# Patient Record
Sex: MALE | Race: OTHER | Employment: FULL TIME | URBAN - METROPOLITAN AREA
[De-identification: names, ages, dates, MRNs, and addresses within clinical notes are randomized per-mention and may not be internally consistent; named-entity substitution may affect disease eponyms.]

---

## 2018-12-12 ENCOUNTER — OFFICE VISIT (OUTPATIENT)
Dept: FAMILY MEDICINE CLINIC | Age: 48
End: 2018-12-12

## 2018-12-12 VITALS
DIASTOLIC BLOOD PRESSURE: 72 MMHG | TEMPERATURE: 98 F | SYSTOLIC BLOOD PRESSURE: 118 MMHG | RESPIRATION RATE: 18 BRPM | HEIGHT: 67 IN | HEART RATE: 80 BPM | BODY MASS INDEX: 26.68 KG/M2 | WEIGHT: 170 LBS

## 2018-12-12 DIAGNOSIS — B20 HIV (HUMAN IMMUNODEFICIENCY VIRUS INFECTION) (HCC): Primary | ICD-10-CM

## 2018-12-12 RX ORDER — ABACAVIR 300 MG/1
TABLET ORAL 2 TIMES DAILY
COMMUNITY
End: 2019-03-06 | Stop reason: SDUPTHER

## 2018-12-12 RX ORDER — FLUCONAZOLE 100 MG/1
200 TABLET ORAL DAILY
COMMUNITY
End: 2019-11-25 | Stop reason: ALTCHOICE

## 2018-12-12 RX ORDER — SULFAMETHOXAZOLE AND TRIMETHOPRIM 800; 160 MG/1; MG/1
1 TABLET ORAL 2 TIMES DAILY
COMMUNITY
End: 2019-03-13 | Stop reason: ALTCHOICE

## 2018-12-12 RX ORDER — CIPROFLOXACIN 500 MG/1
TABLET ORAL 2 TIMES DAILY
COMMUNITY
End: 2019-11-25 | Stop reason: ALTCHOICE

## 2018-12-12 RX ORDER — LAMIVUDINE 150 MG/1
TABLET, FILM COATED ORAL
COMMUNITY
End: 2019-03-06 | Stop reason: SDUPTHER

## 2018-12-12 RX ORDER — PANTOPRAZOLE SODIUM 40 MG/1
40 TABLET, DELAYED RELEASE ORAL DAILY
COMMUNITY
End: 2022-04-20

## 2018-12-12 RX ORDER — ACYCLOVIR 400 MG/1
400 TABLET ORAL
COMMUNITY
End: 2019-11-25 | Stop reason: ALTCHOICE

## 2018-12-20 NOTE — PROGRESS NOTES
Infectious Disease Consult    Impression/Plan   · Newly diagnosed HIV. Most recent CD4 80. On triumeq, azithro, and tmp-smx. Will order staging labs. RTO 3 months. Will discuss timing of vaccinations with Oncology  · Stage 4 large B cell lymphoma. R-EPOCH under supervision of Dr Dominga Linares:   1. Triumeq  2. Azithromycin  3. tmp-smx  4. Fluconazole, cipro, acyclovir (per oncology on chemotherapy)    Subjective:   Date of Consultation:  2018  Referring Physician: Dr Rocco Tolliver    Patient is a 46 y.o. male who is being seen for newly diagnosed HIV. Recently diagnosed with lymphoma  W/u revealed patient to be HIV positive. Seen by Dr Rocco Tolliver at [facility] and was started on HAART with triumeq. Labs at that time revealed CD4 of 84 and ,000. Has also started chemotherapy for the lymphoma. Risk for HIV is MSM    Patient Active Problem List   Diagnosis Code    HIV (human immunodeficiency virus infection) ([facility] B20     Past Medical History:   Diagnosis Date    HIV (human immunodeficiency virus infection) ([facility]       History reviewed. No pertinent family history. Social History     Tobacco Use    Smoking status: Never Smoker    Smokeless tobacco: Never Used   Substance Use Topics    Alcohol use: No     Frequency: Never     History reviewed. No pertinent surgical history. Prior to Admission medications    Medication Sig Start Date End Date Taking? Authorizing Provider   abacavir (ZIAGEN) 300 mg tablet Take  by mouth two (2) times a day. Yes Provider, Historical   acyclovir (ZOVIRAX) 400 mg tablet Take 400 mg by mouth every four (4) hours (while awake). Yes Provider, Historical   ciprofloxacin HCl (CIPRO) 500 mg tablet Take  by mouth two (2) times a day. Yes Provider, Historical   dolutegravir (TIVICAY) 50 mg tab tablet Take  by mouth. Yes Provider, Historical   fluconazole (DIFLUCAN) 100 mg tablet Take 200 mg by mouth daily.  FDA advises cautious prescribing of oral fluconazole in pregnancy. Yes Provider, Historical   lamiVUDine (EPIVIR) 150 mg tablet Take  by mouth. Yes Provider, Historical   pantoprazole (PROTONIX) 40 mg tablet Take 40 mg by mouth daily. Yes Provider, Historical   trimethoprim-sulfamethoxazole (BACTRIM DS, SEPTRA DS) 160-800 mg per tablet Take 1 Tab by mouth two (2) times a day. Yes Provider, Historical     No Known Allergies     Review of Systems:  A comprehensive review of systems was negative except for that written in the History of Present Illness. Objective:   Blood pressure 118/72, pulse 80, temperature 98 °F (36.7 °C), temperature source Oral, resp. rate 18, height 5' 7\" (1.702 m), weight 77.1 kg (170 lb). @TBEP(96)@     Exam:    General:  Alert, cooperative, well noursished, well developed, appears stated age   Eyes:  Sclera anicteric. Mouth/Throat: Mucous membranes moist   Neck: Supple   Lungs:   Clear to auscultation bilaterally   CV:  Regular rate and rhythm   Abdomen:   Soft, non-tender. Extremities: No edema   Skin: No rash   Lymph nodes:    Musculoskeletal:    Lines/Devices: I   Psych: Alert and oriented, normal mood        Data Review: No results found for this or any previous visit (from the past 24 hour(s)).      Microbiology:      Studies:  Signed By: Mechelle Smith DO     December 20, 2018

## 2019-03-08 NOTE — TELEPHONE ENCOUNTER
Pharmacy called to check status of refills. States pt will run out of medication this weekend. Please address as soon as possible.

## 2019-03-11 RX ORDER — ABACAVIR 300 MG/1
300 TABLET ORAL 2 TIMES DAILY
Qty: 60 TAB | Refills: 5 | Status: SHIPPED | OUTPATIENT
Start: 2019-03-11 | End: 2019-08-28 | Stop reason: SDUPTHER

## 2019-03-11 RX ORDER — LAMIVUDINE 150 MG/1
150 TABLET, FILM COATED ORAL 2 TIMES DAILY
Qty: 60 TAB | Refills: 5 | Status: SHIPPED | OUTPATIENT
Start: 2019-03-11 | End: 2019-08-28 | Stop reason: SDUPTHER

## 2019-03-13 ENCOUNTER — OFFICE VISIT (OUTPATIENT)
Dept: FAMILY MEDICINE CLINIC | Age: 49
End: 2019-03-13

## 2019-03-13 VITALS
DIASTOLIC BLOOD PRESSURE: 87 MMHG | WEIGHT: 182 LBS | HEIGHT: 67 IN | SYSTOLIC BLOOD PRESSURE: 129 MMHG | HEART RATE: 86 BPM | BODY MASS INDEX: 28.56 KG/M2 | TEMPERATURE: 97.9 F | RESPIRATION RATE: 18 BRPM

## 2019-03-13 DIAGNOSIS — B20 HIV (HUMAN IMMUNODEFICIENCY VIRUS INFECTION) (HCC): Primary | ICD-10-CM

## 2019-03-13 RX ORDER — SULFAMETHOXAZOLE AND TRIMETHOPRIM 800; 160 MG/1; MG/1
1 TABLET ORAL DAILY
Qty: 30 TAB | Refills: 5 | Status: SHIPPED | OUTPATIENT
Start: 2019-03-13 | End: 2019-08-28 | Stop reason: SDUPTHER

## 2019-03-13 RX ORDER — AZITHROMYCIN 600 MG/1
1200 TABLET, FILM COATED ORAL
Qty: 8 TAB | Refills: 3 | Status: SHIPPED | OUTPATIENT
Start: 2019-03-18 | End: 2019-06-13 | Stop reason: ALTCHOICE

## 2019-03-13 NOTE — PROGRESS NOTES
Chief Complaint   Patient presents with    Medication Evaluation     HIV    Labs     needs to have updated labs drawn

## 2019-03-14 NOTE — PROGRESS NOTES
[facility] Infectious Disease Specialists Progress Note           Violetta Morgan DO    [phone] Office  [phone]  Fax    3/14/2019      Assessment & Plan:   1. Recently diagnosed HIV. Repeat CD4 was 80 with an undetectable viral load. I suspect the CD4 count remains low due to recent chemotherapy. Continue triumeq. It is not clear that he has been taking azithromycin and trimethoprim sulfamethoxazole for OI prophylaxis. New prescriptions for these medications were sent to his pharmacy. RTO in 3 months. We will need to discuss timing of vaccinations with oncology  2. Stage 4 large B cell lymphoma. R-EPOCH under supervision of Dr Nacho Soto  3. Indeterminant QuantiFERON gold. Will repeat     3/6/19. CD4 80. VL<20          Subjective:     No complaints. Tolerating medications. No missed doses. Objective:     Vitals:   Visit Vitals  /87   Pulse 86   Temp 97.9 °F (36.6 °C) (Oral)   Resp 18   Ht 5' 7\" (1.702 m)   Wt 82.6 kg (182 lb)   BMI 28.51 kg/m²            Exam:       Physical Examination:   General:  Alert, cooperative, no distress   Head:  Normocephalic, atraumatic. Eyes:  Conjunctivae clear   Neck: Supple       Lungs:   No distress. Clear to auscultation bilaterally. Chest wall:     Heart:  Regular rate and rhythm, S1, S2 normal, no murmur   Abdomen:   Soft, non-tender, non-distended   Extremities: Moves all. No cyanosis or edema. Skin:  No rashes or lesions   Neurologic: CNII-XII intact. Normal strength     Labs:        No lab exists for component: ITNL   No results for input(s): CPK, CKMB, TROIQ in the last 72 hours. No results for input(s): NA, K, CL, CO2, BUN, CREA, GLU, PHOS, MG, ALB, WBC, HGB, HCT, PLT, HGBEXT, HCTEXT, PLTEXT in the last 72 hours. No lab exists for component:  CA  No results for input(s): INR, PTP, APTT in the last 72 hours.     No lab exists for component: INREXT  Needs: urine analysis, urine sodium, protein and creatinine  No results found for: MARK, MILTON      Cultures:     No results found for: SDES  No results found for: CULT    Radiology:     Medications       Current Outpatient Medications   Medication Sig Dispense    trimethoprim-sulfamethoxazole (BACTRIM DS, SEPTRA DS) 160-800 mg per tablet Take 1 Tab by mouth daily. 30 Tab    [START ON 3/18/2019] azithromycin (ZITHROMAX) 600 mg tablet Take 2 Tabs by mouth every Monday. 8 Tab    dolutegravir (TIVICAY) 50 mg tab tablet Take 1 Tab by mouth daily. 30 Tab    abacavir (ZIAGEN) 300 mg tablet Take 1 Tab by mouth two (2) times a day. 60 Tab    lamiVUDine (EPIVIR) 150 mg tablet Take 1 Tab by mouth two (2) times a day. 60 Tab    acyclovir (ZOVIRAX) 400 mg tablet Take 400 mg by mouth every four (4) hours (while awake).  ciprofloxacin HCl (CIPRO) 500 mg tablet Take  by mouth two (2) times a day.  fluconazole (DIFLUCAN) 100 mg tablet Take 200 mg by mouth daily. FDA advises cautious prescribing of oral fluconazole in pregnancy.  pantoprazole (PROTONIX) 40 mg tablet Take 40 mg by mouth daily. No current facility-administered medications for this visit.             Case discussed with:      Jeannette Garza DO

## 2019-03-15 LAB
BASOPHILS # BLD AUTO: 0 X10E3/UL (ref 0–0.2)
BASOPHILS NFR BLD AUTO: 0 %
CD3+CD4+ CELLS # BLD: 80 /UL (ref 359–1519)
CD3+CD4+ CELLS NFR BLD: 19.9 % (ref 30.8–58.5)
CHOLEST SERPL-MCNC: 170 MG/DL (ref 100–199)
CMV IGG SERPL IA-ACNC: >10 U/ML (ref 0–0.59)
EOSINOPHIL # BLD AUTO: 0.1 X10E3/UL (ref 0–0.4)
EOSINOPHIL NFR BLD AUTO: 1 %
ERYTHROCYTE [DISTWIDTH] IN BLOOD BY AUTOMATED COUNT: 21.9 % (ref 12.3–15.4)
G6PD BLD QN: 414 U/10E12 RBC (ref 146–376)
GAMMA INTERFERON BACKGROUND BLD IA-ACNC: 0.03 IU/ML
HCT VFR BLD AUTO: 24.5 % (ref 37.5–51)
HDLC SERPL-MCNC: 19 MG/DL
HGB BLD-MCNC: 7.8 G/DL (ref 13–17.7)
HIV1 RNA # SERPL NAA+PROBE: <20 COPIES/ML
HIV1 RNA SERPL NAA+PROBE-LOG#: NORMAL LOG10COPY/ML
HLA-B*57:01 SPEC QL: NEGATIVE
IMM GRANULOCYTES # BLD AUTO: 0.1 X10E3/UL (ref 0–0.1)
IMM GRANULOCYTES NFR BLD AUTO: 2 %
INTERPRETATION, 910389: NORMAL
LDLC SERPL CALC-MCNC: 115 MG/DL (ref 0–99)
LYMPHOCYTES # BLD AUTO: 0.4 X10E3/UL (ref 0.7–3.1)
LYMPHOCYTES NFR BLD AUTO: 12 %
M TB IFN-G BLD-IMP: ABNORMAL
M TB IFN-G CD4+ BCKGRND COR BLD-ACNC: 0.04 IU/ML
MCH RBC QN AUTO: 27.4 PG (ref 26.6–33)
MCHC RBC AUTO-ENTMCNC: 31.8 G/DL (ref 31.5–35.7)
MCV RBC AUTO: 86 FL (ref 79–97)
MITOGEN IGNF BLD-ACNC: 0.41 IU/ML
MONOCYTES # BLD AUTO: 0.3 X10E3/UL (ref 0.1–0.9)
MONOCYTES NFR BLD AUTO: 8 %
MORPHOLOGY BLD-IMP: ABNORMAL
NEUTROPHILS # BLD AUTO: 2.8 X10E3/UL (ref 1.4–7)
NEUTROPHILS NFR BLD AUTO: 77 %
PLATELET # BLD AUTO: 309 X10E3/UL (ref 150–379)
QUANTIFERON INCUBATION, QF1T: ABNORMAL
QUANTIFERON TB2 AG: 0.04 IU/ML
RBC # BLD AUTO: 2.85 X10E6/UL (ref 4.14–5.8)
RPR SER QL: NON REACTIVE
SERVICE CMNT-IMP: ABNORMAL
T GONDII IGG SERPL IA-ACNC: >400 IU/ML (ref 0–7.1)
TRIGL SERPL-MCNC: 181 MG/DL (ref 0–149)
VLDLC SERPL CALC-MCNC: 36 MG/DL (ref 5–40)
VZV IGG SER IA-ACNC: 2767 INDEX
VZV IGM SER IA-ACNC: <0.91 INDEX (ref 0–0.9)
WBC # BLD AUTO: 3.7 X10E3/UL (ref 3.4–10.8)

## 2019-03-21 ENCOUNTER — TELEPHONE (OUTPATIENT)
Dept: FAMILY MEDICINE CLINIC | Age: 49
End: 2019-03-21

## 2019-03-21 NOTE — TELEPHONE ENCOUNTER
Jillian Lane called from [facility] to obtain diagnosis code for pt's labs ordered 3/6/19. Diagnosis code given for billing purposes.  Viktor

## 2019-04-08 ENCOUNTER — TELEPHONE (OUTPATIENT)
Dept: FAMILY MEDICINE CLINIC | Age: 49
End: 2019-04-08

## 2019-04-11 NOTE — TELEPHONE ENCOUNTER
Roxy Calixto called [facility] [address] [facility] to confirm pt's prescriptions written by Dr. Tonya Kent and to advise of prior auth needed for Zithromax due to directions.  Addisonm

## 2019-06-07 LAB
ALBUMIN SERPL-MCNC: 4.4 G/DL (ref 3.5–5.5)
ALBUMIN/GLOB SERPL: 1.7 {RATIO} (ref 1.2–2.2)
ALP SERPL-CCNC: 123 IU/L (ref 39–117)
ALT SERPL-CCNC: 20 IU/L (ref 0–44)
AST SERPL-CCNC: 32 IU/L (ref 0–40)
BILIRUB SERPL-MCNC: <0.2 MG/DL (ref 0–1.2)
BUN SERPL-MCNC: 14 MG/DL (ref 6–24)
BUN/CREAT SERPL: 17 (ref 9–20)
CALCIUM SERPL-MCNC: 9.2 MG/DL (ref 8.7–10.2)
CHLORIDE SERPL-SCNC: 104 MMOL/L (ref 96–106)
CO2 SERPL-SCNC: 20 MMOL/L (ref 20–29)
CREAT SERPL-MCNC: 0.81 MG/DL (ref 0.76–1.27)
GLOBULIN SER CALC-MCNC: 2.6 G/DL (ref 1.5–4.5)
GLUCOSE SERPL-MCNC: 90 MG/DL (ref 65–99)
POTASSIUM SERPL-SCNC: 4.3 MMOL/L (ref 3.5–5.2)
PROT SERPL-MCNC: 7 G/DL (ref 6–8.5)
SODIUM SERPL-SCNC: 137 MMOL/L (ref 134–144)

## 2019-06-08 LAB
ALBUMIN SERPL-MCNC: 4.4 G/DL (ref 3.5–5.5)
ALBUMIN/GLOB SERPL: 1.5 {RATIO} (ref 1.2–2.2)
ALP SERPL-CCNC: 126 IU/L (ref 39–117)
ALT SERPL-CCNC: 20 IU/L (ref 0–44)
AST SERPL-CCNC: 33 IU/L (ref 0–40)
BASOPHILS # BLD AUTO: 0 X10E3/UL (ref 0–0.2)
BASOPHILS NFR BLD AUTO: 0 %
BILIRUB SERPL-MCNC: <0.2 MG/DL (ref 0–1.2)
BUN SERPL-MCNC: 14 MG/DL (ref 6–24)
BUN/CREAT SERPL: 16 (ref 9–20)
CALCIUM SERPL-MCNC: 9.3 MG/DL (ref 8.7–10.2)
CD3+CD4+ CELLS # BLD: 182 /UL (ref 359–1519)
CD3+CD4+ CELLS NFR BLD: 11.4 % (ref 30.8–58.5)
CHLORIDE SERPL-SCNC: 105 MMOL/L (ref 96–106)
CO2 SERPL-SCNC: 21 MMOL/L (ref 20–29)
CREAT SERPL-MCNC: 0.85 MG/DL (ref 0.76–1.27)
EOSINOPHIL # BLD AUTO: 0.1 X10E3/UL (ref 0–0.4)
EOSINOPHIL NFR BLD AUTO: 4 %
ERYTHROCYTE [DISTWIDTH] IN BLOOD BY AUTOMATED COUNT: 17.2 % (ref 12.3–15.4)
GLOBULIN SER CALC-MCNC: 2.9 G/DL (ref 1.5–4.5)
GLUCOSE SERPL-MCNC: 90 MG/DL (ref 65–99)
HCT VFR BLD AUTO: 34.5 % (ref 37.5–51)
HGB BLD-MCNC: 10.6 G/DL (ref 13–17.7)
HIV1 RNA # SERPL NAA+PROBE: <20 COPIES/ML
HIV1 RNA SERPL NAA+PROBE-LOG#: NORMAL LOG10COPY/ML
IMM GRANULOCYTES # BLD AUTO: 0.1 X10E3/UL (ref 0–0.1)
IMM GRANULOCYTES NFR BLD AUTO: 4 %
LYMPHOCYTES # BLD AUTO: 1.6 X10E3/UL (ref 0.7–3.1)
LYMPHOCYTES NFR BLD AUTO: 54 %
MCH RBC QN AUTO: 24.1 PG (ref 26.6–33)
MCHC RBC AUTO-ENTMCNC: 30.7 G/DL (ref 31.5–35.7)
MCV RBC AUTO: 78 FL (ref 79–97)
MONOCYTES # BLD AUTO: 0.2 X10E3/UL (ref 0.1–0.9)
MONOCYTES NFR BLD AUTO: 6 %
MORPHOLOGY BLD-IMP: ABNORMAL
NEUTROPHILS # BLD AUTO: 0.9 X10E3/UL (ref 1.4–7)
NEUTROPHILS NFR BLD AUTO: 32 %
PLATELET # BLD AUTO: 272 X10E3/UL (ref 150–450)
POTASSIUM SERPL-SCNC: 4.3 MMOL/L (ref 3.5–5.2)
PROT SERPL-MCNC: 7.3 G/DL (ref 6–8.5)
RBC # BLD AUTO: 4.4 X10E6/UL (ref 4.14–5.8)
SODIUM SERPL-SCNC: 138 MMOL/L (ref 134–144)
WBC # BLD AUTO: 2.9 X10E3/UL (ref 3.4–10.8)

## 2019-06-12 ENCOUNTER — OFFICE VISIT (OUTPATIENT)
Dept: FAMILY MEDICINE CLINIC | Age: 49
End: 2019-06-12

## 2019-06-12 VITALS
WEIGHT: 188 LBS | DIASTOLIC BLOOD PRESSURE: 87 MMHG | TEMPERATURE: 96.9 F | SYSTOLIC BLOOD PRESSURE: 127 MMHG | RESPIRATION RATE: 18 BRPM | HEART RATE: 82 BPM | BODY MASS INDEX: 29.51 KG/M2 | HEIGHT: 67 IN

## 2019-06-12 DIAGNOSIS — B20 HIV INFECTION, UNSPECIFIED SYMPTOM STATUS (HCC): Primary | ICD-10-CM

## 2019-06-12 DIAGNOSIS — B20 HIV INFECTION, UNSPECIFIED SYMPTOM STATUS (HCC): ICD-10-CM

## 2019-06-12 NOTE — PROGRESS NOTES
Chief Complaint   Patient presents with    Medication Evaluation     Tivicay    Results     labs resulted 03/06/2019

## 2019-06-13 ENCOUNTER — TELEPHONE (OUTPATIENT)
Dept: FAMILY MEDICINE CLINIC | Age: 49
End: 2019-06-13

## 2019-06-13 NOTE — TELEPHONE ENCOUNTER
Lidia called from [facility] requesting ICD 10 code for pt's CMP ordered 6/6/19 and was advised of diagnosis code listed for visit,  B20 for billing purposes.  Viktor

## 2019-06-14 NOTE — PROGRESS NOTES
[facility] Infectious Disease Specialists Progress Note           Ben Menard DO    [phone] Office  [phone]  Fax    6/13/2019      Assessment & Plan:   1. HIV. Stable on dolutegravir, abacavir, and lamivudine. Stop azithromycin. Patient will need to continue TMP/SMX. Follow-up in 3 months. May change to a single tab regimen at that time if no further chemotherapy is planned. We will need to discuss timing of vaccinations with oncology  2. Stage 4 large B cell lymphoma. R-EPOCH under supervision of Dr Perez Johnson. According to the patient his chemotherapy has been completed. He has not followed up with oncology  3. Indeterminant QuantiFERON gold. Will repeat   4. Rash. Doubt related to ART. HLA-B*5701 was negative  6/6/2019 CD4 182 VL <20   3/6/19. CD4 80. VL<20          Subjective:     No complaints. Tolerating ART    Objective:       Exam:       Physical Examination:   General:  Alert, cooperative, no distress   Head:  Normocephalic, atraumatic. Eyes:  Conjunctivae clear   Neck: Supple       Lungs:   No distress. Clear to auscultation bilaterally. Chest wall:     Heart:  Regular rate and rhythm, S1, S2 normal, no murmur   Abdomen:   Soft, non-tender, non-distended   Extremities: Moves all. Skin:  Trace papular rash on the forearms   Neurologic: CNII-XII intact. Normal strength     Labs:        No lab exists for component: ITNL   No results for input(s): CPK, CKMB, TROIQ in the last 72 hours. No results for input(s): NA, K, CL, CO2, BUN, CREA, GLU, PHOS, MG, ALB, WBC, HGB, HCT, PLT, HGBEXT, HCTEXT, PLTEXT in the last 72 hours. No lab exists for component:  CA  No results for input(s): INR, PTP, APTT in the last 72 hours.     No lab exists for component: INREXT  Needs: urine analysis, urine sodium, protein and creatinine  No results found for: MARK, CREAU      Cultures:     No results found for: SDES  No results found for: CULT    Radiology:     Medications       Current Outpatient Medications   Medication Sig Dispense    dolutegravir (TIVICAY) 50 mg tab tablet Take 1 Tab by mouth daily. 30 Tab    lamiVUDine (EPIVIR) 150 mg tablet Take 1 Tab by mouth two (2) times a day. 60 Tab    acyclovir (ZOVIRAX) 400 mg tablet Take 400 mg by mouth every four (4) hours (while awake).  pantoprazole (PROTONIX) 40 mg tablet Take 40 mg by mouth daily.  trimethoprim-sulfamethoxazole (BACTRIM DS, SEPTRA DS) 160-800 mg per tablet Take 1 Tab by mouth daily. 30 Tab    azithromycin (ZITHROMAX) 600 mg tablet Take 2 Tabs by mouth every Monday. 8 Tab    abacavir (ZIAGEN) 300 mg tablet Take 1 Tab by mouth two (2) times a day. 60 Tab    ciprofloxacin HCl (CIPRO) 500 mg tablet Take  by mouth two (2) times a day.  fluconazole (DIFLUCAN) 100 mg tablet Take 200 mg by mouth daily. FDA advises cautious prescribing of oral fluconazole in pregnancy. No current facility-administered medications for this visit.             Case discussed with:      Ben Menard DO

## 2019-09-02 RX ORDER — LAMIVUDINE 150 MG/1
TABLET, FILM COATED ORAL
Qty: 60 TAB | Refills: 0 | Status: SHIPPED | OUTPATIENT
Start: 2019-09-02 | End: 2019-10-03 | Stop reason: SDUPTHER

## 2019-09-02 RX ORDER — ABACAVIR 300 MG/1
TABLET ORAL
Qty: 60 TAB | Refills: 0 | Status: SHIPPED | OUTPATIENT
Start: 2019-09-02 | End: 2019-10-03 | Stop reason: SDUPTHER

## 2019-09-02 RX ORDER — SULFAMETHOXAZOLE AND TRIMETHOPRIM 800; 160 MG/1; MG/1
TABLET ORAL
Qty: 30 TAB | Refills: 0 | Status: SHIPPED | OUTPATIENT
Start: 2019-09-02 | End: 2019-10-03 | Stop reason: SDUPTHER

## 2019-09-02 RX ORDER — DOLUTEGRAVIR SODIUM 50 MG/1
TABLET, FILM COATED ORAL
Qty: 30 TAB | Refills: 0 | Status: SHIPPED | OUTPATIENT
Start: 2019-09-02 | End: 2019-10-03 | Stop reason: SDUPTHER

## 2019-09-12 LAB
ALBUMIN SERPL-MCNC: 4.1 G/DL (ref 3.5–5.5)
ALBUMIN/GLOB SERPL: 1.4 {RATIO} (ref 1.2–2.2)
ALP SERPL-CCNC: 134 IU/L (ref 39–117)
ALT SERPL-CCNC: 23 IU/L (ref 0–44)
AST SERPL-CCNC: 27 IU/L (ref 0–40)
BASOPHILS # BLD AUTO: 0 X10E3/UL (ref 0–0.2)
BASOPHILS NFR BLD AUTO: 1 %
BILIRUB SERPL-MCNC: <0.2 MG/DL (ref 0–1.2)
BUN SERPL-MCNC: 19 MG/DL (ref 6–24)
BUN/CREAT SERPL: 20 (ref 9–20)
CALCIUM SERPL-MCNC: 9.1 MG/DL (ref 8.7–10.2)
CD3+CD4+ CELLS # BLD: 206 /UL (ref 359–1519)
CD3+CD4+ CELLS NFR BLD: 12.1 % (ref 30.8–58.5)
CHLORIDE SERPL-SCNC: 103 MMOL/L (ref 96–106)
CO2 SERPL-SCNC: 20 MMOL/L (ref 20–29)
CREAT SERPL-MCNC: 0.97 MG/DL (ref 0.76–1.27)
EOSINOPHIL # BLD AUTO: 0.1 X10E3/UL (ref 0–0.4)
EOSINOPHIL NFR BLD AUTO: 3 %
ERYTHROCYTE [DISTWIDTH] IN BLOOD BY AUTOMATED COUNT: 20.1 % (ref 12.3–15.4)
GLOBULIN SER CALC-MCNC: 3 G/DL (ref 1.5–4.5)
GLUCOSE SERPL-MCNC: 94 MG/DL (ref 65–99)
HCT VFR BLD AUTO: 37 % (ref 37.5–51)
HGB BLD-MCNC: 11.7 G/DL (ref 13–17.7)
HIV1 RNA # SERPL NAA+PROBE: 50 COPIES/ML
HIV1 RNA SERPL NAA+PROBE-LOG#: 1.7 LOG10COPY/ML
IMM GRANULOCYTES # BLD AUTO: 0 X10E3/UL (ref 0–0.1)
IMM GRANULOCYTES NFR BLD AUTO: 1 %
LYMPHOCYTES # BLD AUTO: 1.7 X10E3/UL (ref 0.7–3.1)
LYMPHOCYTES NFR BLD AUTO: 45 %
MCH RBC QN AUTO: 25.6 PG (ref 26.6–33)
MCHC RBC AUTO-ENTMCNC: 31.6 G/DL (ref 31.5–35.7)
MCV RBC AUTO: 81 FL (ref 79–97)
MONOCYTES # BLD AUTO: 0.3 X10E3/UL (ref 0.1–0.9)
MONOCYTES NFR BLD AUTO: 9 %
NEUTROPHILS # BLD AUTO: 1.5 X10E3/UL (ref 1.4–7)
NEUTROPHILS NFR BLD AUTO: 41 %
PLATELET # BLD AUTO: 243 X10E3/UL (ref 150–450)
POTASSIUM SERPL-SCNC: 4.3 MMOL/L (ref 3.5–5.2)
PROT SERPL-MCNC: 7.1 G/DL (ref 6–8.5)
RBC # BLD AUTO: 4.57 X10E6/UL (ref 4.14–5.8)
SODIUM SERPL-SCNC: 140 MMOL/L (ref 134–144)
WBC # BLD AUTO: 3.6 X10E3/UL (ref 3.4–10.8)

## 2019-10-03 NOTE — TELEPHONE ENCOUNTER
Tiff called from [address] to see if Dr. Rupesh Reyes can order additional refills on pt's medications or order 90 day supply.  Viktor

## 2019-10-04 RX ORDER — LAMIVUDINE 150 MG/1
TABLET, FILM COATED ORAL
Qty: 60 TAB | Refills: 0 | Status: SHIPPED | OUTPATIENT
Start: 2019-10-04 | End: 2019-11-02 | Stop reason: SDUPTHER

## 2019-10-04 RX ORDER — DOLUTEGRAVIR SODIUM 50 MG/1
TABLET, FILM COATED ORAL
Qty: 30 TAB | Refills: 0 | Status: SHIPPED | OUTPATIENT
Start: 2019-10-04 | End: 2019-11-02 | Stop reason: SDUPTHER

## 2019-10-04 RX ORDER — ABACAVIR 300 MG/1
TABLET ORAL
Qty: 60 TAB | Refills: 0 | Status: SHIPPED | OUTPATIENT
Start: 2019-10-04 | End: 2019-11-02 | Stop reason: SDUPTHER

## 2019-10-04 RX ORDER — SULFAMETHOXAZOLE AND TRIMETHOPRIM 800; 160 MG/1; MG/1
TABLET ORAL
Qty: 30 TAB | Refills: 0 | Status: SHIPPED | OUTPATIENT
Start: 2019-10-04 | End: 2019-11-02 | Stop reason: SDUPTHER

## 2019-11-02 RX ORDER — ABACAVIR 300 MG/1
TABLET ORAL
Qty: 60 TAB | Refills: 0 | Status: SHIPPED | OUTPATIENT
Start: 2019-11-02 | End: 2021-01-28 | Stop reason: ALTCHOICE

## 2019-11-02 RX ORDER — LAMIVUDINE 150 MG/1
TABLET, FILM COATED ORAL
Qty: 60 TAB | Refills: 0 | Status: SHIPPED | OUTPATIENT
Start: 2019-11-02 | End: 2021-01-28 | Stop reason: ALTCHOICE

## 2019-11-02 RX ORDER — SULFAMETHOXAZOLE AND TRIMETHOPRIM 800; 160 MG/1; MG/1
TABLET ORAL
Qty: 30 TAB | Refills: 0 | Status: SHIPPED | OUTPATIENT
Start: 2019-11-02

## 2019-11-02 RX ORDER — DOLUTEGRAVIR SODIUM 50 MG/1
TABLET, FILM COATED ORAL
Qty: 30 TAB | Refills: 0 | Status: SHIPPED | OUTPATIENT
Start: 2019-11-02 | End: 2021-01-28 | Stop reason: ALTCHOICE

## 2019-11-20 ENCOUNTER — OFFICE VISIT (OUTPATIENT)
Dept: FAMILY MEDICINE CLINIC | Age: 49
End: 2019-11-20

## 2019-11-20 VITALS
SYSTOLIC BLOOD PRESSURE: 139 MMHG | HEART RATE: 74 BPM | WEIGHT: 202 LBS | RESPIRATION RATE: 18 BRPM | DIASTOLIC BLOOD PRESSURE: 93 MMHG | BODY MASS INDEX: 31.71 KG/M2 | TEMPERATURE: 97 F | HEIGHT: 67 IN

## 2019-11-20 DIAGNOSIS — Z21 ASYMPTOMATIC HIV INFECTION (HCC): Primary | ICD-10-CM

## 2019-11-20 NOTE — PROGRESS NOTES
Chief Complaint   Patient presents with    Medication Evaluation     Tivicay     Results     09/13/2019

## 2019-11-26 NOTE — PROGRESS NOTES
[address] [place] [address] Infectious Disease Specialists Progress Note           Emperatriz Villar DO    [phone] Office  [phone]  Fax    11/25/2019      Assessment & Plan:   1. HIV. Stable on dolutegravir, abacavir, and lamivudine. CD4 above 200. Will stop TMP/SMX at next visit as long as CD4 remains above 200. Changed ART to a single tab regimen. 2. Stage 4 large B cell lymphoma. Status post R-EPOCH under supervision of Dr Desi Rust. Chemotherapy has been completed. 3. Indeterminant QuantiFERON gold.  Will repeat   4. Rash. Doubt related to ART. HLA-B*5701 was negative    9/10/19. CD4 206. Viral load 50  6/6/2019 CD4 182 VL <20   3/6/19.  CD4 80.  VL<20          Subjective:     No complaints. Has completed chemotherapy    Objective:     Vitals:   Visit Vitals  BP (!) 139/93   Pulse 74   Temp 97 °F (36.1 °C) (Oral)   Resp 18   Ht 5' 7\" (1.702 m)   Wt 91.6 kg (202 lb)   BMI 31.64 kg/m²            Exam:     Physical Examination:   General:  Alert, cooperative, no distress   Head:  Normocephalic, atraumatic. Eyes:  Conjunctivae clear   Neck: Supple       Lungs:   No distress. Clear to auscultation bilaterally. Chest wall:     Heart:  Regular rate and rhythm   Abdomen:   Non-distended   Extremities: Moves all. Skin:  No rash   Neurologic: CNII-XII intact. Normal strength     Labs:        No lab exists for component: ITNL   No results for input(s): CPK, CKMB, TROIQ in the last 72 hours. No results for input(s): NA, K, CL, CO2, BUN, CREA, GLU, PHOS, MG, ALB, WBC, HGB, HCT, PLT, HGBEXT, HCTEXT, PLTEXT in the last 72 hours. No lab exists for component:  CA  No results for input(s): INR, PTP, APTT, INREXT in the last 72 hours.   Needs: urine analysis, urine sodium, protein and creatinine  No results found for: MARK, CREAU      Cultures:     No results found for: SDES  No results found for: CULT    Radiology:     Medications       Current Outpatient Medications   Medication Sig Dispense    trimethoprim-sulfamethoxazole (BACTRIM DS, SEPTRA DS) 160-800 mg per tablet TAKE 1 TABLET BY MOUTH DAILY 30 Tab    lamiVUDine (EPIVIR) 150 mg tablet TAKE 1 TABLET BY MOUTH TWICE DAILY 60 Tab    abacavir (ZIAGEN) 300 mg tablet TAKE 1 TABLET BY MOUTH TWICE DAILY 60 Tab    TIVICAY 50 mg tab tablet TAKE 1 TABLET BY MOUTH DAILY 30 Tab    acyclovir (ZOVIRAX) 400 mg tablet Take 400 mg by mouth every four (4) hours (while awake).  ciprofloxacin HCl (CIPRO) 500 mg tablet Take  by mouth two (2) times a day.  fluconazole (DIFLUCAN) 100 mg tablet Take 200 mg by mouth daily. FDA advises cautious prescribing of oral fluconazole in pregnancy.  pantoprazole (PROTONIX) 40 mg tablet Take 40 mg by mouth daily. No current facility-administered medications for this visit.             Case discussed with:      Riri Elders, DO

## 2020-02-05 LAB
ALBUMIN SERPL-MCNC: 3.9 G/DL (ref 3.8–4.9)
ALBUMIN/GLOB SERPL: 1.4 {RATIO} (ref 1.2–2.2)
ALP SERPL-CCNC: 120 IU/L (ref 39–117)
ALT SERPL-CCNC: 16 IU/L (ref 0–44)
AST SERPL-CCNC: 20 IU/L (ref 0–40)
BASOPHILS # BLD AUTO: 0 X10E3/UL (ref 0–0.2)
BASOPHILS NFR BLD AUTO: 1 %
BILIRUB SERPL-MCNC: <0.2 MG/DL (ref 0–1.2)
BUN SERPL-MCNC: 14 MG/DL (ref 6–24)
BUN/CREAT SERPL: 15 (ref 9–20)
CALCIUM SERPL-MCNC: 8.6 MG/DL (ref 8.7–10.2)
CD3+CD4+ CELLS # BLD: 281 /UL (ref 359–1519)
CD3+CD4+ CELLS NFR BLD: 12.2 % (ref 30.8–58.5)
CHLORIDE SERPL-SCNC: 106 MMOL/L (ref 96–106)
CO2 SERPL-SCNC: 19 MMOL/L (ref 20–29)
CREAT SERPL-MCNC: 0.96 MG/DL (ref 0.76–1.27)
EOSINOPHIL # BLD AUTO: 0.1 X10E3/UL (ref 0–0.4)
EOSINOPHIL NFR BLD AUTO: 2 %
ERYTHROCYTE [DISTWIDTH] IN BLOOD BY AUTOMATED COUNT: 16.2 % (ref 11.6–15.4)
GLOBULIN SER CALC-MCNC: 2.7 G/DL (ref 1.5–4.5)
GLUCOSE SERPL-MCNC: 110 MG/DL (ref 65–99)
HCT VFR BLD AUTO: 35.5 % (ref 37.5–51)
HGB BLD-MCNC: 11.6 G/DL (ref 13–17.7)
HIV1 RNA # SERPL NAA+PROBE: 20 COPIES/ML
HIV1 RNA SERPL NAA+PROBE-LOG#: 1.3 LOG10COPY/ML
IMM GRANULOCYTES # BLD AUTO: 0 X10E3/UL (ref 0–0.1)
IMM GRANULOCYTES NFR BLD AUTO: 0 %
LYMPHOCYTES # BLD AUTO: 2.3 X10E3/UL (ref 0.7–3.1)
LYMPHOCYTES NFR BLD AUTO: 46 %
MCH RBC QN AUTO: 27.6 PG (ref 26.6–33)
MCHC RBC AUTO-ENTMCNC: 32.7 G/DL (ref 31.5–35.7)
MCV RBC AUTO: 84 FL (ref 79–97)
MONOCYTES # BLD AUTO: 0.4 X10E3/UL (ref 0.1–0.9)
MONOCYTES NFR BLD AUTO: 8 %
NEUTROPHILS # BLD AUTO: 2.2 X10E3/UL (ref 1.4–7)
NEUTROPHILS NFR BLD AUTO: 43 %
PLATELET # BLD AUTO: 213 X10E3/UL (ref 150–450)
POTASSIUM SERPL-SCNC: 3.7 MMOL/L (ref 3.5–5.2)
PROT SERPL-MCNC: 6.6 G/DL (ref 6–8.5)
RBC # BLD AUTO: 4.21 X10E6/UL (ref 4.14–5.8)
SODIUM SERPL-SCNC: 139 MMOL/L (ref 134–144)
WBC # BLD AUTO: 5 X10E3/UL (ref 3.4–10.8)

## 2020-02-19 ENCOUNTER — OFFICE VISIT (OUTPATIENT)
Dept: FAMILY MEDICINE CLINIC | Age: 50
End: 2020-02-19

## 2020-02-19 VITALS
HEIGHT: 67 IN | TEMPERATURE: 98.8 F | BODY MASS INDEX: 31.71 KG/M2 | DIASTOLIC BLOOD PRESSURE: 88 MMHG | SYSTOLIC BLOOD PRESSURE: 130 MMHG | RESPIRATION RATE: 18 BRPM | WEIGHT: 202 LBS | HEART RATE: 80 BPM

## 2020-02-19 DIAGNOSIS — Z21 ASYMPTOMATIC HIV INFECTION (HCC): Primary | ICD-10-CM

## 2020-02-21 NOTE — PROGRESS NOTES
[facility] Infectious Disease Specialists Progress Note           Jermaine Casanova DO    [phone] Office  [phone]  Fax    2/21/2020      Assessment & Plan:   HIV. Stable on Triumeq. CD4 up to 281. Stop TMP/SMX. RTO 3 months    Stage 4 large B cell lymphoma. Status post R-EPOCH under supervision of Dr Andrea Mendez. Chemotherapy has been completed.      Indeterminant QuantiFERON gold.  Will repeat     Gastric ulcer. Work-up underway by GI     2/3/2020. CD4 281. Viral load <20  9/10/19. CD4 206. Viral load 50  6/6/2019 CD4 182 VL <20   3/6/19.  CD4 80.  VL<20          Subjective:     No complaints. Tolerating ART. Undergoing work-up for GI ulcer    Objective:     Vitals:   Visit Vitals  /88   Pulse 80   Temp 98.8 °F (37.1 °C) (Oral)   Resp 18   Ht 5' 7\" (1.702 m)   Wt 202 lb (91.6 kg)   BMI 31.64 kg/m²            Exam:     Physical Examination:   General:  Alert, cooperative, no distress   Head:  Normocephalic, atraumatic. Eyes:  Conjunctivae clear   Neck: Supple       Lungs:   No distress. Clear to auscultation bilaterally. Chest wall:     Heart:  Regular rate and rhythm, S1, S2 normal, no murmur   Abdomen:   Soft, non-tender, non-distended   Extremities: Moves all. No edema   Skin:  No rash   Neurologic: CNII-XII intact. Normal strength     Labs:        No lab exists for component: ITNL   No results for input(s): CPK, CKMB, TROIQ in the last 72 hours. No results for input(s): NA, K, CL, CO2, BUN, CREA, GLU, PHOS, MG, ALB, WBC, HGB, HCT, PLT, HGBEXT, HCTEXT, PLTEXT in the last 72 hours. No lab exists for component:  CA  No results for input(s): INR, PTP, APTT, INREXT in the last 72 hours.   Needs: urine analysis, urine sodium, protein and creatinine  No results found for: MARK, CREAU      Cultures:     No results found for: SDES  No results found for: CULT    Radiology:     Medications       Current Outpatient Medications   Medication Sig Dispense    trimethoprim-sulfamethoxazole (BACTRIM DS, SEPTRA DS) 160-800 mg per tablet TAKE 1 TABLET BY MOUTH DAILY 30 Tab    lamiVUDine (EPIVIR) 150 mg tablet TAKE 1 TABLET BY MOUTH TWICE DAILY 60 Tab    abacavir (ZIAGEN) 300 mg tablet TAKE 1 TABLET BY MOUTH TWICE DAILY 60 Tab    TIVICAY 50 mg tab tablet TAKE 1 TABLET BY MOUTH DAILY 30 Tab    pantoprazole (PROTONIX) 40 mg tablet Take 40 mg by mouth daily. No current facility-administered medications for this visit.             Case discussed with:      Ben Menard DO

## 2020-05-19 DIAGNOSIS — Z21 ASYMPTOMATIC HIV INFECTION (HCC): ICD-10-CM

## 2020-06-11 LAB
ALBUMIN SERPL-MCNC: 4 G/DL (ref 3.8–4.9)
ALBUMIN/GLOB SERPL: 1.1 {RATIO} (ref 1.2–2.2)
ALP SERPL-CCNC: 142 IU/L (ref 39–117)
ALT SERPL-CCNC: 23 IU/L (ref 0–44)
AST SERPL-CCNC: 31 IU/L (ref 0–40)
BASOPHILS # BLD AUTO: 0 X10E3/UL (ref 0–0.2)
BASOPHILS NFR BLD AUTO: 1 %
BILIRUB SERPL-MCNC: 0.3 MG/DL (ref 0–1.2)
BUN SERPL-MCNC: 14 MG/DL (ref 6–24)
BUN/CREAT SERPL: 19 (ref 9–20)
CALCIUM SERPL-MCNC: 9 MG/DL (ref 8.7–10.2)
CD3+CD4+ CELLS # BLD: 276 /UL (ref 359–1519)
CD3+CD4+ CELLS NFR BLD: 13.8 % (ref 30.8–58.5)
CHLORIDE SERPL-SCNC: 104 MMOL/L (ref 96–106)
CO2 SERPL-SCNC: 20 MMOL/L (ref 20–29)
CREAT SERPL-MCNC: 0.72 MG/DL (ref 0.76–1.27)
EOSINOPHIL # BLD AUTO: 0.2 X10E3/UL (ref 0–0.4)
EOSINOPHIL NFR BLD AUTO: 3 %
ERYTHROCYTE [DISTWIDTH] IN BLOOD BY AUTOMATED COUNT: 15.1 % (ref 11.6–15.4)
GLOBULIN SER CALC-MCNC: 3.5 G/DL (ref 1.5–4.5)
GLUCOSE SERPL-MCNC: 92 MG/DL (ref 65–99)
HCT VFR BLD AUTO: 38.7 % (ref 37.5–51)
HGB BLD-MCNC: 12.6 G/DL (ref 13–17.7)
HIV1 RNA # SERPL NAA+PROBE: <20 COPIES/ML
HIV1 RNA SERPL NAA+PROBE-LOG#: NORMAL LOG10COPY/ML
IMM GRANULOCYTES # BLD AUTO: 0 X10E3/UL (ref 0–0.1)
IMM GRANULOCYTES NFR BLD AUTO: 0 %
LYMPHOCYTES # BLD AUTO: 2 X10E3/UL (ref 0.7–3.1)
LYMPHOCYTES NFR BLD AUTO: 35 %
MCH RBC QN AUTO: 27.9 PG (ref 26.6–33)
MCHC RBC AUTO-ENTMCNC: 32.6 G/DL (ref 31.5–35.7)
MCV RBC AUTO: 86 FL (ref 79–97)
MONOCYTES # BLD AUTO: 0.6 X10E3/UL (ref 0.1–0.9)
MONOCYTES NFR BLD AUTO: 10 %
NEUTROPHILS # BLD AUTO: 3 X10E3/UL (ref 1.4–7)
NEUTROPHILS NFR BLD AUTO: 51 %
PLATELET # BLD AUTO: 240 X10E3/UL (ref 150–450)
POTASSIUM SERPL-SCNC: 4.3 MMOL/L (ref 3.5–5.2)
PROT SERPL-MCNC: 7.5 G/DL (ref 6–8.5)
RBC # BLD AUTO: 4.51 X10E6/UL (ref 4.14–5.8)
SODIUM SERPL-SCNC: 137 MMOL/L (ref 134–144)
WBC # BLD AUTO: 5.8 X10E3/UL (ref 3.4–10.8)

## 2020-06-15 ENCOUNTER — TELEPHONE (OUTPATIENT)
Dept: FAMILY MEDICINE CLINIC | Age: 50
End: 2020-06-15

## 2020-06-15 NOTE — TELEPHONE ENCOUNTER
----- Message from Gloria Fenton sent at 6/12/2020  3:54 PM EDT -----  Regarding: /telephone  General Message/Vendor Calls    Caller's first and last name:      Reason for call: The pt would like to reschedule his 5/20/20 appt.       Callback required yes/no and why:yes      Best contact number(s):[phone]

## 2020-08-05 ENCOUNTER — OFFICE VISIT (OUTPATIENT)
Dept: FAMILY MEDICINE CLINIC | Age: 50
End: 2020-08-05
Payer: COMMERCIAL

## 2020-08-05 VITALS
WEIGHT: 197 LBS | DIASTOLIC BLOOD PRESSURE: 87 MMHG | BODY MASS INDEX: 30.92 KG/M2 | HEART RATE: 80 BPM | TEMPERATURE: 97.3 F | HEIGHT: 67 IN | SYSTOLIC BLOOD PRESSURE: 123 MMHG

## 2020-08-05 DIAGNOSIS — Z21 ASYMPTOMATIC HIV INFECTION (HCC): Primary | ICD-10-CM

## 2020-08-05 PROCEDURE — 99213 OFFICE O/P EST LOW 20 MIN: CPT | Performed by: INTERNAL MEDICINE

## 2020-08-05 RX ORDER — ABACAVIR SULFATE, DOLUTEGRAVIR SODIUM, LAMIVUDINE 600; 50; 300 MG/1; MG/1; MG/1
TABLET, FILM COATED ORAL DAILY
COMMUNITY
End: 2020-11-24 | Stop reason: SDUPTHER

## 2020-08-05 NOTE — PROGRESS NOTES
[facility] Disease Special[facility] Progress Note           Lisette Lopez DO    [phone] Office  [phone]  Fax    8/5/2020      Assessment & Plan:   HIV. Stable on Triumeq. CD4 remains above 200. TMP/SMX stopped at previous visit. [place] RTO 4 months     Stage 4 large B cell lymphoma.  Status post R-EPOCH under supervision of Dr Alfonso Wiggins. Chemotherapy has been completed.       Indeterminant QuantiFERON gold.  Will repeat with next labs       6/8/2020 CD4 276 Viral load <20  2/3/2020. CD4 281. Viral load <20  9/10/19.  CD4 206.  Viral load 50  6/6/2019 CD4 182 VL <20   3/6/19.  CD4 80.  VL<20          Subjective:     No complaints    Objective:     Vitals:   Visit Vitals  /87   Pulse 80   Temp 97.3 °F (36.3 °C) (Temporal)   Ht 5' 7\" (1.702 m)   Wt 197 lb (89.4 kg)   BMI 30.85 kg/m²       Exam:     Physical Examination:   General:  Alert, cooperative, no distress   Head:  Normocephalic, atraumatic. Eyes:  Conjunctivae clear   Neck: Supple       Lungs:   No distress. Clear to auscultation bilaterally. Chest wall:     Heart:  Regular rate and rhythm, S1, S2 normal, no murmur   Abdomen:    Nondistended   Extremities: Moves all. No edema. Skin:  No rash   Neurologic: CNII-XII intact. Normal strength     Labs:        No lab exists for component: ITNL   No results for input(s): CPK, CKMB, TROIQ in the last 72 hours. No results for input(s): NA, K, CL, CO2, BUN, CREA, GLU, PHOS, MG, ALB, WBC, HGB, HCT, PLT, HGBEXT, HCTEXT, PLTEXT in the last 72 hours. No lab exists for component:  CA  No results for input(s): INR, PTP, APTT, INREXT in the last 72 hours. Needs: urine analysis, urine sodium, protein and creatinine  No results found for: MARK, CREAU      Cultures:     No results found for: SDES  No results found for: CULT    Radiology:     Medications       Current Outpatient Medications   Medication Sig Dispense    abacavir-dolutegravir-lamiVUDine (Triumeq) tablet Take  by mouth daily.      trimethoprim-sulfamethoxazole (BACTRIM DS, SEPTRA DS) 160-800 mg per tablet TAKE 1 TABLET BY MOUTH DAILY 30 Tab    lamiVUDine (EPIVIR) 150 mg tablet TAKE 1 TABLET BY MOUTH TWICE DAILY 60 Tab    abacavir (ZIAGEN) 300 mg tablet TAKE 1 TABLET BY MOUTH TWICE DAILY 60 Tab    TIVICAY 50 mg tab tablet TAKE 1 TABLET BY MOUTH DAILY 30 Tab    pantoprazole (PROTONIX) 40 mg tablet Take 40 mg by mouth daily. No current facility-administered medications for this visit.             Case discussed with:      Chantel Triana DO

## 2020-08-05 NOTE — PROGRESS NOTES
Chief Complaint   Patient presents with    Medication Evaluation     Triumeq    Labs     drawn 06/2020

## 2020-11-05 DIAGNOSIS — Z21 ASYMPTOMATIC HIV INFECTION (HCC): ICD-10-CM

## 2020-11-14 LAB
ALBUMIN SERPL-MCNC: 4.1 G/DL (ref 3.8–4.9)
ALBUMIN/GLOB SERPL: 1.5 {RATIO} (ref 1.2–2.2)
ALP SERPL-CCNC: 119 IU/L (ref 39–117)
ALT SERPL-CCNC: 23 IU/L (ref 0–44)
AST SERPL-CCNC: 29 IU/L (ref 0–40)
BASOPHILS # BLD AUTO: 0 X10E3/UL (ref 0–0.2)
BASOPHILS NFR BLD AUTO: 1 %
BILIRUB SERPL-MCNC: 0.2 MG/DL (ref 0–1.2)
BUN SERPL-MCNC: 16 MG/DL (ref 6–24)
BUN/CREAT SERPL: 19 (ref 9–20)
CALCIUM SERPL-MCNC: 8.6 MG/DL (ref 8.7–10.2)
CD3+CD4+ CELLS # BLD: 377 /UL (ref 359–1519)
CD3+CD4+ CELLS NFR BLD: 16.4 % (ref 30.8–58.5)
CHLORIDE SERPL-SCNC: 104 MMOL/L (ref 96–106)
CO2 SERPL-SCNC: 22 MMOL/L (ref 20–29)
CREAT SERPL-MCNC: 0.85 MG/DL (ref 0.76–1.27)
EOSINOPHIL # BLD AUTO: 0.1 X10E3/UL (ref 0–0.4)
EOSINOPHIL NFR BLD AUTO: 2 %
ERYTHROCYTE [DISTWIDTH] IN BLOOD BY AUTOMATED COUNT: 13.9 % (ref 11.6–15.4)
GAMMA INTERFERON BACKGROUND BLD IA-ACNC: 0.04 IU/ML
GLOBULIN SER CALC-MCNC: 2.8 G/DL (ref 1.5–4.5)
GLUCOSE SERPL-MCNC: 93 MG/DL (ref 65–99)
HCT VFR BLD AUTO: 40.2 % (ref 37.5–51)
HGB BLD-MCNC: 13.1 G/DL (ref 13–17.7)
HIV1 RNA # SERPL NAA+PROBE: <20 COPIES/ML
HIV1 RNA SERPL NAA+PROBE-LOG#: NORMAL LOG10COPY/ML
IMM GRANULOCYTES # BLD AUTO: 0 X10E3/UL (ref 0–0.1)
IMM GRANULOCYTES NFR BLD AUTO: 0 %
LYMPHOCYTES # BLD AUTO: 2.3 X10E3/UL (ref 0.7–3.1)
LYMPHOCYTES NFR BLD AUTO: 44 %
M TB IFN-G BLD-IMP: NEGATIVE
M TB IFN-G CD4+ BCKGRND COR BLD-ACNC: 0.05 IU/ML
MCH RBC QN AUTO: 28.4 PG (ref 26.6–33)
MCHC RBC AUTO-ENTMCNC: 32.6 G/DL (ref 31.5–35.7)
MCV RBC AUTO: 87 FL (ref 79–97)
MITOGEN IGNF BLD-ACNC: >10 IU/ML
MONOCYTES # BLD AUTO: 0.6 X10E3/UL (ref 0.1–0.9)
MONOCYTES NFR BLD AUTO: 12 %
NEUTROPHILS # BLD AUTO: 2.1 X10E3/UL (ref 1.4–7)
NEUTROPHILS NFR BLD AUTO: 41 %
PLATELET # BLD AUTO: 217 X10E3/UL (ref 150–450)
POTASSIUM SERPL-SCNC: 4.2 MMOL/L (ref 3.5–5.2)
PROT SERPL-MCNC: 6.9 G/DL (ref 6–8.5)
QUANTIFERON INCUBATION, QF1T: NORMAL
QUANTIFERON TB2 AG: 0.04 IU/ML
RBC # BLD AUTO: 4.62 X10E6/UL (ref 4.14–5.8)
SERVICE CMNT-IMP: NORMAL
SODIUM SERPL-SCNC: 136 MMOL/L (ref 134–144)
WBC # BLD AUTO: 5.2 X10E3/UL (ref 3.4–10.8)

## 2020-11-24 RX ORDER — ABACAVIR SULFATE, DOLUTEGRAVIR SODIUM, LAMIVUDINE 600; 50; 300 MG/1; MG/1; MG/1
1 TABLET, FILM COATED ORAL DAILY
Qty: 30 TAB | Refills: 0 | Status: SHIPPED | OUTPATIENT
Start: 2020-11-24 | End: 2020-12-29 | Stop reason: SDUPTHER

## 2020-12-29 NOTE — TELEPHONE ENCOUNTER
Pt requests refill, states our office cancelled his appt with Dr Denisa Mcclure in Dec and has rescheduled for January.

## 2020-12-30 RX ORDER — ABACAVIR SULFATE, DOLUTEGRAVIR SODIUM, LAMIVUDINE 600; 50; 300 MG/1; MG/1; MG/1
1 TABLET, FILM COATED ORAL DAILY
Qty: 30 TAB | Refills: 0 | Status: SHIPPED | OUTPATIENT
Start: 2020-12-30 | End: 2021-01-06 | Stop reason: SDUPTHER

## 2021-01-04 ENCOUNTER — TELEPHONE (OUTPATIENT)
Dept: FAMILY MEDICINE CLINIC | Age: 51
End: 2021-01-04

## 2021-01-04 NOTE — TELEPHONE ENCOUNTER
Pt states his pharmacy is telling him he will owe $400 for his prescriptions from Dr Bishnu Beltran and is asking if there are any assistance programs that he can apply for/  Please call [phone]

## 2021-01-06 RX ORDER — ABACAVIR SULFATE, DOLUTEGRAVIR SODIUM, LAMIVUDINE 600; 50; 300 MG/1; MG/1; MG/1
1 TABLET, FILM COATED ORAL DAILY
Qty: 90 TAB | Refills: 0 | Status: CANCELLED | OUTPATIENT
Start: 2021-01-06

## 2021-01-06 RX ORDER — ABACAVIR SULFATE, DOLUTEGRAVIR SODIUM, LAMIVUDINE 600; 50; 300 MG/1; MG/1; MG/1
1 TABLET, FILM COATED ORAL DAILY
Qty: 90 TAB | Refills: 0 | Status: SHIPPED | COMMUNITY
Start: 2021-01-06 | End: 2021-01-22 | Stop reason: SDUPTHER

## 2021-01-06 NOTE — TELEPHONE ENCOUNTER
Pt stopped by office requesting 90 day refill on Triumeq be sent to [id number] today.  Addisonm

## 2021-01-06 NOTE — TELEPHONE ENCOUNTER
New prescription electronically sent in. I called and left a message on his voicemail. A co-pay card may be able to help with pharmacy bills.   We have them here he may come to pick 1 up if he is interested

## 2021-01-06 NOTE — TELEPHONE ENCOUNTER
----- Message from Rayna Bowling sent at 1/6/2021 10:24 AM EST -----  Regarding: Dr. Rachel Garvey required yes/no and why: yes       Best contact number(s): [phone]      Details to clarify the request: Patient called regarding rx for  triumeq, needs to be changed to a new pharmacy- needs to be sent to [facility] rx can be faxed  @ [phone] - please call patient with an update as it is needed to be sent today      Rayna Bowling

## 2021-01-18 NOTE — TELEPHONE ENCOUNTER
Please inform Delmon Kin that the patient has indeed been screened for HLA B5 701 which was negative and has been tolerating Triumeq for over 2 years with no problems

## 2021-01-18 NOTE — TELEPHONE ENCOUNTER
Called [facility] and spoke with Alan Luu. She has been advised and states understanding of this per orders.

## 2021-01-22 ENCOUNTER — OFFICE VISIT (OUTPATIENT)
Dept: FAMILY MEDICINE CLINIC | Age: 51
End: 2021-01-22
Payer: COMMERCIAL

## 2021-01-22 VITALS
DIASTOLIC BLOOD PRESSURE: 95 MMHG | HEIGHT: 67 IN | SYSTOLIC BLOOD PRESSURE: 135 MMHG | TEMPERATURE: 98 F | RESPIRATION RATE: 20 BRPM | BODY MASS INDEX: 32.96 KG/M2 | WEIGHT: 210 LBS | HEART RATE: 67 BPM

## 2021-01-22 DIAGNOSIS — Z21 ASYMPTOMATIC HIV INFECTION (HCC): Primary | ICD-10-CM

## 2021-01-22 PROCEDURE — 99213 OFFICE O/P EST LOW 20 MIN: CPT | Performed by: INTERNAL MEDICINE

## 2021-01-22 RX ORDER — ABACAVIR SULFATE, DOLUTEGRAVIR SODIUM, LAMIVUDINE 600; 50; 300 MG/1; MG/1; MG/1
1 TABLET, FILM COATED ORAL DAILY
Qty: 90 TAB | Refills: 2 | Status: SHIPPED | COMMUNITY
Start: 2021-01-22 | End: 2021-01-28

## 2021-01-22 NOTE — PROGRESS NOTES
Chief Complaint   Patient presents with    Medication Evaluation     Truimeq follow up     Results     labs from 11/2020

## 2021-01-22 NOTE — PROGRESS NOTES
[facility] Infectious Disease Specialists Progress Note           Rayna Lipoma DO    [phone] Office  [phone]  Fax    1/22/2021      Assessment & Plan:   HIV.  Stable on Triumeq.  CD4  up to 377. RTO 6 months. Will discuss vaccinations at next visit     Stage 4 large B cell lymphoma.  Status post R-EPOCH under supervision of Dr Rosy Brush. Chemotherapy has been completed.       Indeterminant QuantiFERON gold.  Repeat QuantiFERON gold from 11/11/2020 was negative     Elevated alkaline phosphatase. Chronic.     11/11/2020 CD4 377 viral load <20  6/8/2020 CD4 276 Viral load <20  2/3/2020.  CD4 281.  Viral load <20  9/10/19.  CD4 206.  Viral load 50  6/6/2019 CD4 182 VL <20   3/6/19.  CD4 80.  VL<20          Subjective:     No complaints    Objective:     Vitals:   Visit Vitals  BP (!) 135/95   Pulse 67   Temp 98 °F (36.7 °C) (Temporal)   Resp 20   Ht 5' 7\" (1.702 m)   Wt 210 lb (95.3 kg)   BMI 32.89 kg/m²            Exam:       Physical Examination:   General:  Alert, cooperative, no distress   Head:  Normocephalic, atraumatic. Eyes:  Conjunctivae clear   Neck: Supple       Lungs:   No distress     Chest wall:     Heart:     Abdomen:    Nondistended   Extremities: Moves all. Skin:  No rash   Neurologic: CNII-XII intact. Normal strength     Labs:        No lab exists for component: ITNL   No results for input(s): CPK, CKMB, TROIQ in the last 72 hours. No results for input(s): NA, K, CL, CO2, BUN, CREA, GLU, PHOS, MG, ALB, WBC, HGB, HCT, PLT, HGBEXT, HCTEXT, PLTEXT in the last 72 hours. No lab exists for component:  CA  No results for input(s): INR, PTP, APTT, INREXT in the last 72 hours.   Needs: urine analysis, urine sodium, protein and creatinine  No results found for: MARK, CREAU      Cultures:     No results found for: SDES  No results found for: CULT    Radiology:     Medications       Current Outpatient Medications   Medication Sig Dispense    abacavir-dolutegravir-lamiVUDine (Triumeq) tablet Take 1 Tab by mouth daily. 90 Tab    trimethoprim-sulfamethoxazole (BACTRIM DS, SEPTRA DS) 160-800 mg per tablet TAKE 1 TABLET BY MOUTH DAILY 30 Tab    lamiVUDine (EPIVIR) 150 mg tablet TAKE 1 TABLET BY MOUTH TWICE DAILY 60 Tab    abacavir (ZIAGEN) 300 mg tablet TAKE 1 TABLET BY MOUTH TWICE DAILY 60 Tab    TIVICAY 50 mg tab tablet TAKE 1 TABLET BY MOUTH DAILY 30 Tab    pantoprazole (PROTONIX) 40 mg tablet Take 40 mg by mouth daily. No current facility-administered medications for this visit.             Case discussed with:      Marli Stanley DO

## 2021-01-28 DIAGNOSIS — Z21 ASYMPTOMATIC HIV INFECTION (HCC): ICD-10-CM

## 2021-01-28 RX ORDER — ABACAVIR SULFATE, DOLUTEGRAVIR SODIUM, LAMIVUDINE 600; 50; 300 MG/1; MG/1; MG/1
TABLET, FILM COATED ORAL
Qty: 30 TAB | Refills: 5 | Status: SHIPPED | OUTPATIENT
Start: 2021-01-28

## 2021-07-22 DIAGNOSIS — Z21 ASYMPTOMATIC HIV INFECTION (HCC): ICD-10-CM

## 2021-10-04 DIAGNOSIS — Z21 ASYMPTOMATIC HIV INFECTION (HCC): ICD-10-CM

## 2021-10-11 RX ORDER — ABACAVIR SULFATE, DOLUTEGRAVIR SODIUM, LAMIVUDINE 600; 50; 300 MG/1; MG/1; MG/1
TABLET, FILM COATED ORAL
Qty: 90 TABLET | Refills: 1 | OUTPATIENT
Start: 2021-10-11

## 2021-10-14 LAB
ALBUMIN SERPL-MCNC: 4.1 G/DL (ref 3.8–4.9)
ALBUMIN/GLOB SERPL: 1.5 {RATIO} (ref 1.2–2.2)
ALP SERPL-CCNC: 104 IU/L (ref 44–121)
ALT SERPL-CCNC: 28 IU/L (ref 0–44)
AST SERPL-CCNC: 27 IU/L (ref 0–40)
BASOPHILS # BLD AUTO: 0 X10E3/UL (ref 0–0.2)
BASOPHILS NFR BLD AUTO: 1 %
BILIRUB SERPL-MCNC: 0.3 MG/DL (ref 0–1.2)
BUN SERPL-MCNC: 17 MG/DL (ref 6–24)
BUN/CREAT SERPL: 18 (ref 9–20)
CALCIUM SERPL-MCNC: 8.7 MG/DL (ref 8.7–10.2)
CD3+CD4+ CELLS # BLD: 366 /UL (ref 359–1519)
CD3+CD4+ CELLS NFR BLD: 18.3 % (ref 30.8–58.5)
CHLORIDE SERPL-SCNC: 108 MMOL/L (ref 96–106)
CO2 SERPL-SCNC: 23 MMOL/L (ref 20–29)
CREAT SERPL-MCNC: 0.94 MG/DL (ref 0.76–1.27)
EOSINOPHIL # BLD AUTO: 0.1 X10E3/UL (ref 0–0.4)
EOSINOPHIL NFR BLD AUTO: 2 %
ERYTHROCYTE [DISTWIDTH] IN BLOOD BY AUTOMATED COUNT: 13.3 % (ref 11.6–15.4)
GLOBULIN SER CALC-MCNC: 2.8 G/DL (ref 1.5–4.5)
GLUCOSE SERPL-MCNC: 98 MG/DL (ref 65–99)
HCT VFR BLD AUTO: 39.7 % (ref 37.5–51)
HGB BLD-MCNC: 13.4 G/DL (ref 13–17.7)
HIV1 RNA # SERPL NAA+PROBE: <20 COPIES/ML
HIV1 RNA SERPL NAA+PROBE-LOG#: NORMAL LOG10COPY/ML
IMM GRANULOCYTES # BLD AUTO: 0 X10E3/UL (ref 0–0.1)
IMM GRANULOCYTES NFR BLD AUTO: 0 %
LYMPHOCYTES # BLD AUTO: 2 X10E3/UL (ref 0.7–3.1)
LYMPHOCYTES NFR BLD AUTO: 38 %
MCH RBC QN AUTO: 29.6 PG (ref 26.6–33)
MCHC RBC AUTO-ENTMCNC: 33.8 G/DL (ref 31.5–35.7)
MCV RBC AUTO: 88 FL (ref 79–97)
MONOCYTES # BLD AUTO: 0.5 X10E3/UL (ref 0.1–0.9)
MONOCYTES NFR BLD AUTO: 9 %
NEUTROPHILS # BLD AUTO: 2.5 X10E3/UL (ref 1.4–7)
NEUTROPHILS NFR BLD AUTO: 50 %
PLATELET # BLD AUTO: 193 X10E3/UL (ref 150–450)
POTASSIUM SERPL-SCNC: 4.1 MMOL/L (ref 3.5–5.2)
PROT SERPL-MCNC: 6.9 G/DL (ref 6–8.5)
RBC # BLD AUTO: 4.52 X10E6/UL (ref 4.14–5.8)
SODIUM SERPL-SCNC: 143 MMOL/L (ref 134–144)
WBC # BLD AUTO: 5.1 X10E3/UL (ref 3.4–10.8)

## 2021-10-29 ENCOUNTER — TELEPHONE (OUTPATIENT)
Dept: FAMILY MEDICINE CLINIC | Age: 51
End: 2021-10-29

## 2021-10-29 NOTE — TELEPHONE ENCOUNTER
----- Message from Prettyjessica Marsh sent at 10/29/2021 10:38 AM EDT -----  Subject: Appointment Request    Reason for Call: Routine Existing Condition Follow Up    QUESTIONS  Type of Appointment? Established Patient  Reason for appointment request? No appointments available during search  Additional Information for Provider? Pt called to schedule a medication   follow up appt, he missed the appt scheduled for 10/27 due to the loss of   a family member. No availability on schedule during search, call pt as   soon as possible to schedule appt.  ---------------------------------------------------------------------------  --------------  CALL BACK INFO  What is the best way for the office to contact you? OK to leave message on   voicemail  Preferred Call Back Phone Number? [phone]  ---------------------------------------------------------------------------  --------------  SCRIPT ANSWERS  Relationship to Patient? Self  Specialty Confirmation? Primary Care  (Is the patient requesting to be seen urgently for their symptoms?)? No  Is this follow up request related to routine Diabetes Management? No  Are you having any new concerns about your existing condition? No  Have you been diagnosed with, awaiting test results for, or told that you   are suspected of having COVID-19 (Coronavirus)? (If patient has tested   negative or was tested as a requirement for work, school, or travel and   not based on symptoms, answer no)? No  Within the past two weeks have you developed any of the following symptoms   (answer no if symptoms have been present longer than 2 weeks or began   more than 2 weeks ago)? Fever or Chills, Cough, Shortness of breath or   difficulty breathing, Loss of taste or smell, Sore throat, Nasal   congestion, Sneezing or runny nose, Fatigue or generalized body aches   (answer no if pain is specific to a body part e.g. back pain), Diarrhea,   Headache?  Yes

## 2021-11-10 ENCOUNTER — OFFICE VISIT (OUTPATIENT)
Dept: INFECTIOUS DISEASES | Age: 51
End: 2021-11-10
Payer: COMMERCIAL

## 2021-11-10 VITALS
HEIGHT: 67 IN | BODY MASS INDEX: 32.65 KG/M2 | TEMPERATURE: 97 F | SYSTOLIC BLOOD PRESSURE: 120 MMHG | RESPIRATION RATE: 20 BRPM | HEART RATE: 74 BPM | OXYGEN SATURATION: 96 % | WEIGHT: 208 LBS | DIASTOLIC BLOOD PRESSURE: 85 MMHG

## 2021-11-10 DIAGNOSIS — B20 HIV INFECTION, UNSPECIFIED SYMPTOM STATUS (HCC): Primary | ICD-10-CM

## 2021-11-10 PROCEDURE — 99214 OFFICE O/P EST MOD 30 MIN: CPT | Performed by: INTERNAL MEDICINE

## 2021-11-10 NOTE — PROGRESS NOTES
Chief Complaint   Patient presents with    Medication Management     [facility] Results     lab results from 10/2021

## 2021-11-10 NOTE — PROGRESS NOTES
[facility] Disease Special[facility] Progress Note           Yefri Anderson DO    [phone] Office  [phone]  Fax    11/10/2021      Assessment & Plan:   HIV.  Stable on Triumeq.   RTO 6 months. Discussed vaccinations and recommended pneumococcal, meningococcal, and the Shingrix vaccine. Will check hepatitis serologies with next labs     Stage 4 large B cell lymphoma.  Status post R-EPOCH under supervision of Dr Isabel Tinsley. Chemotherapy has been completed.       Indeterminant QuantiFERON gold.  Repeat QuantiFERON gold from 11/11/2020 was negative      Elevated alkaline phosphatase. Chronic.     10/12/2021 CD4 366 VL <20  11/11/2020 CD4 377 viral load <20  6/8/2020 CD4 276 Viral load <20  2/3/2020.  CD4 281.  Viral load <20  9/10/19.  CD4 206.  Viral load 50  6/6/2019 CD4 182 VL <20   3/6/19.  CD4 80.  VL<20  Diagnosed 2018. Risk factor MSM          Subjective:     No complaints    Objective:       Exam:  Physical Examination:   General:  Alert, cooperative, no distress   Head:  Normocephalic, atraumatic. Eyes:  Conjunctivae clear   Neck: Supple       Lungs:   No distress. Clear to auscultation bilaterally. Chest wall:  No tenderness or deformity. Heart:  Regular rate and rhythm, S1, S2 normal, no murmur   Abdomen:   Soft, non-tender, non-distended   Extremities: Moves all. Skin:  No rash   Neurologic: CNII-XII intact. Normal strength     Labs:        No lab exists for component: ITNL   No results for input(s): CPK, CKMB, TROIQ in the last 72 hours. No results for input(s): NA, K, CL, CO2, BUN, CREA, GLU, PHOS, MG, ALB, WBC, HGB, HCT, PLT, HGBEXT, HCTEXT, PLTEXT in the last 72 hours. No lab exists for component:  CA  No results for input(s): INR, PTP, APTT, INREXT in the last 72 hours.   Needs: urine analysis, urine sodium, protein and creatinine  No results found for: MARK, CREAU      Cultures:     No results found for: SDES  No results found for: CULT    Radiology:     Medications Current Outpatient Medications   Medication Sig Dispense    abacavir-dolutegravir-lamiVUDine (Triumeq) tablet Take 1 Tablet by mouth daily for 90 days. 90 Tablet    Triumeq tablet TAKE 1 TABLET BY MOUTH EVERY DAY (Patient not taking: Reported on 11/10/2021) 30 Tab    trimethoprim-sulfamethoxazole (BACTRIM DS, SEPTRA DS) 160-800 mg per tablet TAKE 1 TABLET BY MOUTH DAILY (Patient not taking: Reported on 11/10/2021) 30 Tab    pantoprazole (PROTONIX) 40 mg tablet Take 40 mg by mouth daily. (Patient not taking: Reported on 11/10/2021)      No current facility-administered medications for this visit.            Case discussed with:      Mickey Simons DO

## 2022-01-10 RX ORDER — ABACAVIR SULFATE, DOLUTEGRAVIR SODIUM, LAMIVUDINE 600; 50; 300 MG/1; MG/1; MG/1
TABLET, FILM COATED ORAL
Qty: 90 TABLET | Refills: 0 | Status: SHIPPED | OUTPATIENT
Start: 2022-01-10 | End: 2022-07-07

## 2022-04-07 LAB
ALBUMIN SERPL-MCNC: 4.3 G/DL (ref 3.8–4.9)
ALBUMIN/GLOB SERPL: 1.4 {RATIO} (ref 1.2–2.2)
ALP SERPL-CCNC: 111 IU/L (ref 44–121)
ALT SERPL-CCNC: 23 IU/L (ref 0–44)
AST SERPL-CCNC: 23 IU/L (ref 0–40)
BASOPHILS # BLD AUTO: 0 X10E3/UL (ref 0–0.2)
BASOPHILS NFR BLD AUTO: 1 %
BILIRUB SERPL-MCNC: 0.2 MG/DL (ref 0–1.2)
BUN SERPL-MCNC: 19 MG/DL (ref 6–24)
BUN/CREAT SERPL: 21 (ref 9–20)
CALCIUM SERPL-MCNC: 9 MG/DL (ref 8.7–10.2)
CD3+CD4+ CELLS # BLD: 342 /UL (ref 359–1519)
CD3+CD4+ CELLS NFR BLD: 18 % (ref 30.8–58.5)
CHLORIDE SERPL-SCNC: 106 MMOL/L (ref 96–106)
CO2 SERPL-SCNC: 21 MMOL/L (ref 20–29)
CREAT SERPL-MCNC: 0.92 MG/DL (ref 0.76–1.27)
EGFR: 99 ML/MIN/1.73
EOSINOPHIL # BLD AUTO: 0.1 X10E3/UL (ref 0–0.4)
EOSINOPHIL NFR BLD AUTO: 2 %
ERYTHROCYTE [DISTWIDTH] IN BLOOD BY AUTOMATED COUNT: 13.6 % (ref 11.6–15.4)
GLOBULIN SER CALC-MCNC: 3 G/DL (ref 1.5–4.5)
GLUCOSE SERPL-MCNC: 114 MG/DL (ref 65–99)
HAV IGM SERPL QL IA: NEGATIVE
HBV CORE IGM SERPL QL IA: NEGATIVE
HBV SURFACE AG SERPL QL IA: NEGATIVE
HCT VFR BLD AUTO: 41.9 % (ref 37.5–51)
HCV AB S/CO SERPL IA: <0.1 S/CO RATIO (ref 0–0.9)
HCV AB SERPL QL IA: NORMAL
HGB BLD-MCNC: 13.7 G/DL (ref 13–17.7)
HIV1 RNA # SERPL NAA+PROBE: 60 COPIES/ML
HIV1 RNA SERPL NAA+PROBE-LOG#: 1.78 LOG10COPY/ML
IMM GRANULOCYTES # BLD AUTO: 0 X10E3/UL (ref 0–0.1)
IMM GRANULOCYTES NFR BLD AUTO: 0 %
LYMPHOCYTES # BLD AUTO: 1.9 X10E3/UL (ref 0.7–3.1)
LYMPHOCYTES NFR BLD AUTO: 34 %
MCH RBC QN AUTO: 28.7 PG (ref 26.6–33)
MCHC RBC AUTO-ENTMCNC: 32.7 G/DL (ref 31.5–35.7)
MCV RBC AUTO: 88 FL (ref 79–97)
MONOCYTES # BLD AUTO: 0.5 X10E3/UL (ref 0.1–0.9)
MONOCYTES NFR BLD AUTO: 9 %
NEUTROPHILS # BLD AUTO: 3.1 X10E3/UL (ref 1.4–7)
NEUTROPHILS NFR BLD AUTO: 54 %
PLATELET # BLD AUTO: 204 X10E3/UL (ref 150–450)
POTASSIUM SERPL-SCNC: 4.2 MMOL/L (ref 3.5–5.2)
PROT SERPL-MCNC: 7.3 G/DL (ref 6–8.5)
RBC # BLD AUTO: 4.78 X10E6/UL (ref 4.14–5.8)
SODIUM SERPL-SCNC: 142 MMOL/L (ref 134–144)
WBC # BLD AUTO: 5.5 X10E3/UL (ref 3.4–10.8)

## 2022-04-20 ENCOUNTER — OFFICE VISIT (OUTPATIENT)
Dept: INFECTIOUS DISEASES | Age: 52
End: 2022-04-20
Payer: COMMERCIAL

## 2022-04-20 VITALS
SYSTOLIC BLOOD PRESSURE: 113 MMHG | WEIGHT: 207 LBS | TEMPERATURE: 97.2 F | HEIGHT: 67 IN | BODY MASS INDEX: 32.49 KG/M2 | OXYGEN SATURATION: 96 % | DIASTOLIC BLOOD PRESSURE: 83 MMHG | HEART RATE: 85 BPM

## 2022-04-20 DIAGNOSIS — Z21 ASYMPTOMATIC HIV INFECTION, WITH NO HISTORY OF HIV-RELATED ILLNESS (HCC): Primary | ICD-10-CM

## 2022-04-20 PROCEDURE — 99214 OFFICE O/P EST MOD 30 MIN: CPT | Performed by: INTERNAL MEDICINE

## 2022-04-20 RX ORDER — OMEPRAZOLE 40 MG/1
CAPSULE, DELAYED RELEASE ORAL
COMMUNITY
Start: 2022-03-25

## 2022-04-20 RX ORDER — SUCRALFATE 1 G/1
1 TABLET ORAL 2 TIMES DAILY
COMMUNITY
Start: 2022-03-25 | End: 2022-04-20

## 2022-04-20 NOTE — PROGRESS NOTES
[facility] Infect[facility] Disease Special[facility] Progress Note           Forestine Sacks DO    [phone] Office  [phone]  Fax    4/20/2022      Assessment & Plan:   HIV. Viral load elevated at 60. Patient recently started omeprazole and Carafate for a peptic ulcer. Carafate may decrease dolutegravir absorption. I advised patient to take Carafate either 6 hours before or 2 hours after dolutegravir. Repeat labs in 3 months.       Recently diagnosed gastric ulcer. Started on omeprazole and Carafate by GI     Stage 4 large B cell lymphoma.  Status post R-EPOCH under supervision of Dr Jeremy Millard. Chemotherapy has been completed.       Indeterminant QuantiFERON gold.  Repeat QuantiFERON gold from 11/11/2020 was negative      Elevated alkaline phosphatase.  Resolved with most recent labs        4/5/2022 CD4 342 VL 60. Acute hepatitis panel negative. 10/12/2021 CD4 366 VL <20  11/11/2020 CD4 377 viral load <20  6/8/2020 CD4 276 Viral load <20  2/3/2020.  CD4 281.  Viral load <20  9/10/19.  CD4 206.  Viral load 50  6/6/2019 CD4 182 VL <20   3/6/19.  CD4 80.  VL<20  Diagnosed 2018. Risk factor MSM          Subjective:     No complaints    Objective:     Vitals:       Exam    Physical Examin  General:  Alert, cooperative, no distress   Head:  Normocephalic, atraumatic. Eyes:  Conjunctivae clear   Neck: Supple       Lungs:   No distress. Clear to auscultation bilaterally. Chest wall:     Heart:  Regular rate and rhythm   Abdomen:   non-distended   Extremities: Moves all   Skin:  No rash   Neurologic: CNII-XII intact. Normal strength     Labs:        No lab exists for component: ITNL   No results for input(s): CPK, CKMB, TROIQ in the last 72 hours. No results for input(s): NA, K, CL, CO2, BUN, CREA, GLU, PHOS, MG, ALB, WBC, HGB, HCT, PLT, HGBEXT, HCTEXT, PLTEXT in the last 72 hours. No lab exists for component:  CA  No results for input(s): INR, PTP, APTT, INREXT in the last 72 hours.   Needs: urine analysis, urine sodium, protein and creatinine  No results found for: MARK, CREAU      Cultures:     No results found for: SDES  No results found for: CULT    Radiology:     Medications       Current Outpatient Medications   Medication Sig Dispense    omeprazole (PRILOSEC) 40 mg capsule TAKE 1 CAPSULE BY MOUTH EVERY DAY IN THE MORNING     sucralfate (CARAFATE) 1 gram tablet Take 1 g by mouth two (2) times a day.  Triumeq tablet TAKE ONE TABLET BY MOUTH ONCE DAILY. STORE IN ORIGINAL BOTTLE AT ROOM TEMPERATURE. 90 Tablet    Triumeq tablet TAKE 1 TABLET BY MOUTH EVERY DAY 30 Tab    trimethoprim-sulfamethoxazole (BACTRIM DS, SEPTRA DS) 160-800 mg per tablet TAKE 1 TABLET BY MOUTH DAILY (Patient not taking: Reported on 11/10/2021) 30 Tab    pantoprazole (PROTONIX) 40 mg tablet Take 40 mg by mouth daily. (Patient not taking: Reported on 11/10/2021)      No current facility-administered medications for this visit.            Case discussed with:      Krissy Anderson,

## 2022-04-20 NOTE — PROGRESS NOTES
Identified pt with two pt identifiers(name and ). Reviewed record in preparation for visit and have obtained necessary documentation. Chief Complaint   Patient presents with    Follow-up        Vitals:    22 1531   BP: 113/83   Pulse: 85   Temp: 97.2 °F (36.2 °C)   TempSrc: Temporal   SpO2: 96%   Weight: 207 lb (93.9 kg)   Height: 5' 7\" (1.702 m)   PainSc:   0 - No pain       Health Maintenance Due   Topic    Pneumococcal 0-64 years (1 - PCV)    DTaP/Tdap/Td series (1 - Tdap)    Colorectal Cancer Screening Combo     Shingrix Vaccine Age 50> (1 of 2)    COVID-19 Vaccine (3 - Pfizer risk 4-dose series)       Coordination of Care Questionnaire:  :   1) Have you been to an emergency room, urgent care, or hospitalized since your last visit? If yes, where when, and reason for visit? No      2. Have seen or consulted any other health care provider since your last visit? If yes, where when, and reason for visit? Yes seen by gastro doctor for ulcers.

## 2022-07-07 RX ORDER — ABACAVIR SULFATE, DOLUTEGRAVIR SODIUM, LAMIVUDINE 600; 50; 300 MG/1; MG/1; MG/1
TABLET, FILM COATED ORAL
Qty: 90 TABLET | Refills: 0 | Status: SHIPPED | OUTPATIENT
Start: 2022-07-07

## 2022-07-13 LAB
ALBUMIN SERPL-MCNC: 4.3 G/DL (ref 3.8–4.9)
ALBUMIN/GLOB SERPL: 1.4 {RATIO} (ref 1.2–2.2)
ALP SERPL-CCNC: 124 IU/L (ref 44–121)
ALT SERPL-CCNC: 23 IU/L (ref 0–44)
AST SERPL-CCNC: 22 IU/L (ref 0–40)
BASOPHILS # BLD AUTO: 0 X10E3/UL (ref 0–0.2)
BASOPHILS NFR BLD AUTO: 1 %
BILIRUB SERPL-MCNC: 0.3 MG/DL (ref 0–1.2)
BUN SERPL-MCNC: 15 MG/DL (ref 6–24)
BUN/CREAT SERPL: 14 (ref 9–20)
CALCIUM SERPL-MCNC: 9.2 MG/DL (ref 8.7–10.2)
CD3+CD4+ CELLS # BLD: 485 /UL (ref 359–1519)
CD3+CD4+ CELLS NFR BLD: 23.1 % (ref 30.8–58.5)
CHLORIDE SERPL-SCNC: 98 MMOL/L (ref 96–106)
CO2 SERPL-SCNC: 22 MMOL/L (ref 20–29)
CREAT SERPL-MCNC: 1.11 MG/DL (ref 0.76–1.27)
EGFR: 79 ML/MIN/1.73
EOSINOPHIL # BLD AUTO: 0.2 X10E3/UL (ref 0–0.4)
EOSINOPHIL NFR BLD AUTO: 3 %
ERYTHROCYTE [DISTWIDTH] IN BLOOD BY AUTOMATED COUNT: 13.3 % (ref 11.6–15.4)
GLOBULIN SER CALC-MCNC: 3.1 G/DL (ref 1.5–4.5)
GLUCOSE SERPL-MCNC: 82 MG/DL (ref 65–99)
HCT VFR BLD AUTO: 43.4 % (ref 37.5–51)
HGB BLD-MCNC: 14.2 G/DL (ref 13–17.7)
HIV1 RNA # SERPL NAA+PROBE: <20 COPIES/ML
HIV1 RNA SERPL NAA+PROBE-LOG#: NORMAL LOG10COPY/ML
IMM GRANULOCYTES # BLD AUTO: 0 X10E3/UL (ref 0–0.1)
IMM GRANULOCYTES NFR BLD AUTO: 0 %
LYMPHOCYTES # BLD AUTO: 2.1 X10E3/UL (ref 0.7–3.1)
LYMPHOCYTES NFR BLD AUTO: 36 %
MCH RBC QN AUTO: 28.5 PG (ref 26.6–33)
MCHC RBC AUTO-ENTMCNC: 32.7 G/DL (ref 31.5–35.7)
MCV RBC AUTO: 87 FL (ref 79–97)
MONOCYTES # BLD AUTO: 0.5 X10E3/UL (ref 0.1–0.9)
MONOCYTES NFR BLD AUTO: 9 %
NEUTROPHILS # BLD AUTO: 2.9 X10E3/UL (ref 1.4–7)
NEUTROPHILS NFR BLD AUTO: 51 %
PLATELET # BLD AUTO: 228 X10E3/UL (ref 150–450)
POTASSIUM SERPL-SCNC: 4.3 MMOL/L (ref 3.5–5.2)
PROT SERPL-MCNC: 7.4 G/DL (ref 6–8.5)
RBC # BLD AUTO: 4.99 X10E6/UL (ref 4.14–5.8)
SODIUM SERPL-SCNC: 139 MMOL/L (ref 134–144)
WBC # BLD AUTO: 5.7 X10E3/UL (ref 3.4–10.8)

## 2022-07-20 ENCOUNTER — OFFICE VISIT (OUTPATIENT)
Dept: INFECTIOUS DISEASES | Age: 52
End: 2022-07-20
Payer: COMMERCIAL

## 2022-07-20 VITALS
SYSTOLIC BLOOD PRESSURE: 118 MMHG | HEART RATE: 77 BPM | WEIGHT: 203 LBS | TEMPERATURE: 97.2 F | DIASTOLIC BLOOD PRESSURE: 88 MMHG | HEIGHT: 67 IN | RESPIRATION RATE: 18 BRPM | BODY MASS INDEX: 31.86 KG/M2 | OXYGEN SATURATION: 96 %

## 2022-07-20 DIAGNOSIS — Z21 ASYMPTOMATIC HIV INFECTION, WITH NO HISTORY OF HIV-RELATED ILLNESS (HCC): Primary | ICD-10-CM

## 2022-07-20 PROCEDURE — 99213 OFFICE O/P EST LOW 20 MIN: CPT | Performed by: INTERNAL MEDICINE

## 2022-07-20 RX ORDER — SUCRALFATE 1 G/1
1 TABLET ORAL 4 TIMES DAILY
COMMUNITY

## 2022-07-20 NOTE — PROGRESS NOTES
[facility] Infectious Disease Specialists Progress Note           Carmen Cosme DO    [phone] Office  [phone]  Fax    7/20/2022      Assessment & Plan:     HIV. Viral load now undetectable. It was elevated at last admission which was thought to be due to omeprazole and Carafate which have been started for a peptic ulcer. Carafate may decrease dolutegravir absorption. I advised patient to take Carafate either 6 hours before or 2 hours after dolutegravir which he has been doing. RTO 6 months    Gastric ulcer. Remains on omeprazole and Carafate. Patient to follow-up with GI in August    Stage 4 large B cell lymphoma. Status post R-EPOCH under supervision of Dr Vik Ledesma. Chemotherapy h completed in 2019       Indeterminant QuantiFERON gold. Repeat QuantiFERON gold from 11/11/2020 was negative    Elevated alkaline phosphatase. Remains mildly elevated. Follow    7/11/2022 CD4 485 VL <20 alkaline phosphatase 124  4/5/2022 CD4 342 VL 60. Acute hepatitis panel negative. 10/12/2021 CD4 366 VL <20  11/11/2020 CD4 377 viral load <20  6/8/2020 CD4 276 Viral load <20  2/3/2020. CD4 281. Viral load <20  9/10/19. CD4 206. Viral load 50  6/6/2019 CD4 182 VL <20   3/6/19. CD4 80. VL<20  Diagnosed 2018. Risk factor MSM          Subjective:     No complaints    Objective:     Vitals: Visit Vitals  /88   Pulse 77   Temp 97.2 °F (36.2 °C) (Temporal)   Resp 18   Ht 5' 7\" (1.702 m)   Wt 203 lb (92.1 kg)   SpO2 96%   BMI 31.79 kg/m²            Exam:     Physical Examination:   General:  Alert, cooperative, no distress   Head:  Normocephalic, atraumatic. Eyes:  Conjunctivae clear   Neck: Supple       Lungs:   No distress. Clear to auscultation bilaterally. Chest wall:     Heart:  Regular rate and rhythm   Abdomen:   non-distended   Extremities: Moves all   Skin: No acute rash on exposed skin   Neurologic: CNII-XII intact.   Normal strength     Labs:        No lab exists for component: ITNL   No results for input(s): CPK, CKMB, TROIQ in the last 72 hours. No results for input(s): NA, K, CL, CO2, BUN, CREA, GLU, PHOS, MG, ALB, WBC, HGB, HCT, PLT, HGBEXT, HCTEXT, PLTEXT in the last 72 hours. No lab exists for component:  CA  No results for input(s): INR, PTP, APTT, INREXT in the last 72 hours. Needs: urine analysis, urine sodium, protein and creatinine  No results found for: MARK, CREAU      Cultures:     No results found for: SDES  No results found for: CULT    Radiology:     Medications       Current Outpatient Medications   Medication Sig Dispense    sucralfate (CARAFATE) 1 gram tablet Take 1 g by mouth four (4) times daily. Triumeq tablet TAKE ONE TABLET BY MOUTH ONCE DAILY. STORE IN ORIGINAL BOTTLE AT ROOM TEMPERATURE. 90 Tablet    omeprazole (PRILOSEC) 40 mg capsule TAKE 1 CAPSULE BY MOUTH EVERY DAY IN THE MORNING     Triumeq tablet TAKE 1 TABLET BY MOUTH EVERY DAY 30 Tab    trimethoprim-sulfamethoxazole (BACTRIM DS, SEPTRA DS) 160-800 mg per tablet TAKE 1 TABLET BY MOUTH DAILY 30 Tab     No current facility-administered medications for this visit.            Case discussed with:      Kaci Brown DO

## 2022-12-30 RX ORDER — ABACAVIR SULFATE, DOLUTEGRAVIR SODIUM, LAMIVUDINE 600; 50; 300 MG/1; MG/1; MG/1
TABLET, FILM COATED ORAL
Qty: 90 TABLET | Refills: 0 | Status: SHIPPED | OUTPATIENT
Start: 2022-12-30